# Patient Record
Sex: MALE | Race: WHITE | NOT HISPANIC OR LATINO | Employment: STUDENT | URBAN - METROPOLITAN AREA
[De-identification: names, ages, dates, MRNs, and addresses within clinical notes are randomized per-mention and may not be internally consistent; named-entity substitution may affect disease eponyms.]

---

## 2017-06-05 ENCOUNTER — ALLSCRIPTS OFFICE VISIT (OUTPATIENT)
Dept: OTHER | Facility: OTHER | Age: 27
End: 2017-06-05

## 2017-09-07 ENCOUNTER — ALLSCRIPTS OFFICE VISIT (OUTPATIENT)
Dept: OTHER | Facility: OTHER | Age: 27
End: 2017-09-07

## 2017-12-06 ENCOUNTER — ALLSCRIPTS OFFICE VISIT (OUTPATIENT)
Dept: OTHER | Facility: OTHER | Age: 27
End: 2017-12-06

## 2018-01-10 NOTE — PSYCH
Plan    1  PARoxetine HCl - 40 MG Oral Tablet; TAKE 1 TABLET DAILY    2  Propranolol HCl - 20 MG Oral Tablet; TAKE 1 TABLET Daily for anxiety    Chief Complaint  "I just need refills on my medication "      History of Present Illness  Patient is a 33year old male with a significant psych history of depression and anxiety  He had previously been treated by Scar Oropeza in 2013 and received Klonpin and Paxil  Patient moved to [place] 3 years ago and recently moved back this past November 2016  He started seeing Dr Sharon Pierson, who placed him on Duloxetine for depression and Inderal for anxiety  He was stable on these medications  He is doing well today, but has not had any medication refills in several months  He would like to establish care with a new psychiatrist for continuation of medication management  He currently denies all symptoms of depression and anxiety, and he would like to start back on medication just to make sure that his mood continues to remain consistent  Patient reports variable sleeping patterns, which has always been a problem  Appetite is good  Review of Systems    Constitutional: no fever or chills, feels well, no tiredness, no recent weight loss or weight gain  ENT: no complaints of earache, no loss of hearing, no nosebleeds or nasal discharge, no sore throat or hoarseness  Cardiovascular: no complaints of slow or fast heart rate, no chest pain, no palpitations, no leg claudication or lower extremity edema  Respiratory: no complaints of shortness of breath, no wheezing or cough, no dyspnea on exertion, no orthopnea or PND  Gastrointestinal: no complaints of abdominal pain, no constipation, no nausea or vomiting, no diarrhea or bloody stools  Genitourinary: no complaints of dysuria or incontinence, no hesitancy, no nocturia, no genital lesion, no inadequacy of penile erection     Musculoskeletal: no complaints of arthralgia, no myalgia, no joint swelling or stiffness, no limb pain or swelling  Integumentary: no complaints of skin rash or lesion, no itching or dry skin, no skin wounds  Neurological: no complaints of headache, no confusion, no numbness or tingling, no dizziness or fainting  Past Psychiatric History    Past Psychiatric History:   - outpatient treatment in [place] ([facility] and [facility]) and in [place]   - no inpatient admissions   - no suicide attempts  Substance Abuse Hx    Substance Abuse History:   - occasionally smokes   - denies ETOH and illicit drug use  Allergies    1  No Known Drug Allergies    Family Psych History    - depression and anxiety on maternal and paternal side      Social History    - was previously living with mother, but is now moving into an apartment of his own   - he is going to college in [place] and works at [employer]      History Of Phys/Sex Abuse Or Perpetration    History Of Phys/Sex Abuse or Perpetration:   - none  Physical Exam    Appearance: was calm and cooperative and good eye contact  Observed mood: mood appropriate  Observed mood: affect appropriate  Speech: a normal rate  Thought processes: coherent/organized  Hallucinations: no hallucinations present  Thought Content: no delusions  Abnormal Thoughts: The patient has no suicidal thoughts  Orientation: The patient is oriented to person, place and time  Recent and Remote Memory: short term memory intact and long term memory intact  Attention Span And Concentration: concentration intact  Insight: Insight intact  Judgment: His judgment was intact  Treatment Recommendations: re-start Paxil 40 mg, which patient believes was more effective than Cymbalta and Inderal 20 mg QD for anxiety  follow up in 3 months  End of Encounter Meds    1  PARoxetine HCl - 40 MG Oral Tablet; TAKE 1 TABLET DAILY; Therapy: 40UZY9943 to (Evaluate:20Waa0054); Last Rx:05Jun2017 Ordered    2   Propranolol HCl - 20 MG Oral Tablet; TAKE 1 TABLET Daily for anxiety; Therapy: 70BMB5218 to (Evaluate:69Xsk9459);  Last Rx:05Jun2017 Ordered    Signatures   Electronically signed by : Zeke Lake, Hector Atwood; Jun 5 2017  4:34PM EST                       (Author)    Electronically signed by : Mansoor Montenegro MD; Jun 5 2017  4:58PM EST

## 2018-01-15 NOTE — PSYCH
Psych Med Mgmt    Appearance: was calm and cooperative  Observed mood: mood appropriate  Observed mood: affect appropriate  Speech: a normal rate  Thought processes: coherent/organized  Hallucinations: no hallucinations present  Thought Content: no delusions  Abnormal Thoughts: The patient has no suicidal thoughts  Orientation: The patient is oriented to person, place and time  Recent and Remote Memory: short term memory intact and long term memory intact  Attention Span And Concentration: concentration intact  Insight: Insight intact  Judgment: His judgment was intact  Treatment Recommendations: continue on current medications and add xanax 0 25 mg up to 2 times per day PRN for increased anxiety; follow up in 3 months  He reports normal appetite, normal energy level, no weight change and normal number of sleep hours  Patient reports that he is feeling stable on his current medication regimen  Occasionally, he does experience episodes of anxiety secondary to stresses of school, which just started last week  Overall, Propanolol is helping him to keep his symptoms under control, and his blood pressure is stable  However, he would like an additional medication PRN for days in which his anxiety is increased  He is sleeping well and eating well  No acute distress  Assessment    1  Depression (311) (F32 9)   2  Generalized anxiety disorder (300 02) (F41 1)    Plan    1  PARoxetine HCl - 40 MG Oral Tablet; TAKE 1 TABLET DAILY    2  ALPRAZolam 0 25 MG Oral Tablet; Take up to 2 tablets daily as needed   3  Propranolol HCl - 20 MG Oral Tablet; TAKE 1 TABLET Daily for anxiety    Review of Systems    Constitutional: No fever, no chills, feels well, no tiredness, no recent weight gain or loss  Cardiovascular: no complaints of slow or fast heart rate, no chest pain, no palpitations  Respiratory: no complaints of shortness of breath, no wheezing, no dyspnea on exertion  Gastrointestinal: no complaints of abdominal pain, no constipation, no nausea, no diarrhea, no vomiting  Genitourinary: no complaints of dysuria, no incontinence, no pelvic pain, no urinary frequency  Musculoskeletal: no complaints of arthralgia, no myalgias, no limb pain, no joint stiffness  Integumentary: no complaints of skin rash, no itching, no dry skin  Neurological: no complaints of headache, no confusion, no numbness, no dizziness  Active Problems    1  Depression (311) (F32 9)   2  Generalized anxiety disorder (300 02) (F41 1)    Allergies    1  No Known Drug Allergies    Current Meds   1  PARoxetine HCl - 40 MG Oral Tablet; TAKE 1 TABLET DAILY; Therapy: 12DUB4825 to (Evaluate:05Wpf0854); Last Rx:05Jun2017 Ordered   2  Propranolol HCl - 20 MG Oral Tablet; TAKE 1 TABLET Daily for anxiety; Therapy: 47CBA9067 to (Evaluate:05Mkp4902); Last Rx:05Jun2017 Ordered    End of Encounter Meds    1  PARoxetine HCl - 40 MG Oral Tablet; TAKE 1 TABLET DAILY; Therapy: 95PBM2665 to (Evaluate:87Vtd2753); Last Rx:07Sep2017 Ordered    2  ALPRAZolam 0 25 MG Oral Tablet; Take up to 2 tablets daily as needed; Therapy: 23Vfr4289 to (Evaluate:50Efh5894); Last Rx:07Sep2017 Ordered   3  Propranolol HCl - 20 MG Oral Tablet; TAKE 1 TABLET Daily for anxiety; Therapy: 70FRX9395 to (Evaluate:92Mge0236);  Last BB:11MYT8334 Ordered    Signatures   Electronically signed by : Mirta Mazariegos, [facility]; Sep  7 2017  3:41PM EST                       (Author)    Electronically signed by : Sheri Boss MD; Sep  7 2017  5:21PM EST

## 2018-01-23 VITALS
SYSTOLIC BLOOD PRESSURE: 120 MMHG | BODY MASS INDEX: 20.46 KG/M2 | DIASTOLIC BLOOD PRESSURE: 78 MMHG | WEIGHT: 135 LBS | HEIGHT: 68 IN

## 2018-01-23 NOTE — PSYCH
Psych Med Mgmt    Appearance: was calm and cooperative and good eye contact  Observed mood: mood appropriate  Observed mood: affect appropriate  Speech: a normal rate  Thought processes: coherent/organized  Hallucinations: no hallucinations present  Thought Content: no delusions  Abnormal Thoughts: The patient has no suicidal thoughts and no homicidal thoughts  Orientation: The patient is oriented to person, place and time  Recent and Remote Memory: short term memory intact and long term memory intact  Attention Span And Concentration: concentration intact  Insight: Insight intact  Judgment: His judgment was intact  Treatment Recommendations: Follow up in 3 months  Same meds  The patient has been filling controlled prescriptions on time [facility] [facility]       He reports normal appetite, normal energy level and no weight change  Seen for follow up  In a new same-sex relationship started when he moved to [place]  Mother doing well and is now a realtor  Has been on Paxil and Inderal all working well for patient  Will continue same meds and monitor  No new clinical concerns  Doing well in school  Vitals  Signs   Recorded: 92LDN3422 42:41XG   Systolic: 680  Diastolic: 78  Height: 5 ft 8 in  Weight: 135 lb   BMI Calculated: 20 53  BSA Calculated: 1 73    DSM    Provisional Diagnosis: Major Depression   WHITNEY  GAF--70  Assessment    1  Depression (311) (F32 9)    Plan    1  PARoxetine HCl - 40 MG Oral Tablet; TAKE 1 TABLET DAILY    2  Propranolol HCl - 20 MG Oral Tablet; TAKE 1 TABLET Daily for anxiety   3  ALPRAZolam 0 25 MG Oral Tablet; Take up to 2 tablets daily as needed    Active Problems    1  Depression (311) (F32 9)   2  Generalized anxiety disorder (300 02) (F41 1)    Allergies    1  No Known Drug Allergies    Current Meds   1  ALPRAZolam 0 25 MG Oral Tablet;  Take up to 2 tablets daily as needed; Therapy: 07Ual1506 to (Evaluate:05Vrr6666); Last Rx:13Xul1810 Ordered   2  PARoxetine HCl - 40 MG Oral Tablet; TAKE 1 TABLET DAILY; Therapy: 08SEX3801 to (Evaluate:91Cif5745); Last Rx:01Cag5922 Ordered   3  Propranolol HCl - 20 MG Oral Tablet; TAKE 1 TABLET Daily for anxiety; Therapy: 90NTQ7681 to (Evaluate:49Xro7821); Last Rx:28Lft5335 Ordered    End of Encounter Meds    1  PARoxetine HCl - 40 MG Oral Tablet; TAKE 1 TABLET DAILY; Therapy: 76VHL6553 to ()  Requested for: 37UNK4812; Last   Rx:60Pls9477; Status: ACTIVE - Transmit to Pharmacy - Awaiting Verification Ordered    2  ALPRAZolam 0 25 MG Oral Tablet; Take up to 2 tablets daily as needed; Therapy: 47Zlj6772 to (Evaluate:12Csj0730); Last Rx:17Pmf9186 Ordered   3  Propranolol HCl - 20 MG Oral Tablet; TAKE 1 TABLET Daily for anxiety;    Therapy: 23DAI0834 to ()  Requested for: 02XPK8642; Last   Rx:[id number]; Status: [address] stacie Aquino Verification Ordered    Signatures   Electronically signed by : Darion Horn; Dec  6 2017  4:30PM EST                       (Author)    Electronically signed by : Mikaela Rajan MD; Dec  6 2017  4:55PM EST

## 2018-02-28 ENCOUNTER — OFFICE VISIT (OUTPATIENT)
Dept: PSYCHIATRY | Facility: CLINIC | Age: 28
End: 2018-02-28
Payer: COMMERCIAL

## 2018-02-28 VITALS
WEIGHT: 128 LBS | HEIGHT: 68 IN | BODY MASS INDEX: 19.4 KG/M2 | SYSTOLIC BLOOD PRESSURE: 110 MMHG | DIASTOLIC BLOOD PRESSURE: 80 MMHG

## 2018-02-28 DIAGNOSIS — F33.0 MILD EPISODE OF RECURRENT MAJOR DEPRESSIVE DISORDER (HCC): ICD-10-CM

## 2018-02-28 DIAGNOSIS — F33.1 MAJOR DEPRESSIVE DISORDER, RECURRENT, MODERATE (HCC): Primary | ICD-10-CM

## 2018-02-28 PROCEDURE — 99214 OFFICE O/P EST MOD 30 MIN: CPT | Performed by: NURSE PRACTITIONER

## 2018-02-28 RX ORDER — PROPRANOLOL HYDROCHLORIDE 20 MG/1
20 TABLET ORAL DAILY
COMMUNITY
Start: 2017-06-05 | End: 2018-02-28 | Stop reason: SDUPTHER

## 2018-02-28 RX ORDER — PROPRANOLOL HYDROCHLORIDE 20 MG/1
20 TABLET ORAL DAILY PRN
Qty: 30 TABLET | Refills: 2 | Status: SHIPPED | OUTPATIENT
Start: 2018-02-28 | End: 2018-11-25 | Stop reason: ALTCHOICE

## 2018-02-28 RX ORDER — ALPRAZOLAM 0.25 MG/1
0.25 TABLET ORAL DAILY PRN
COMMUNITY
Start: 2017-09-07

## 2018-02-28 RX ORDER — PAROXETINE 10 MG/1
TABLET, FILM COATED ORAL
Qty: 14 TABLET | Refills: 0 | Status: SHIPPED | OUTPATIENT
Start: 2018-02-28 | End: 2018-11-25 | Stop reason: ALTCHOICE

## 2018-02-28 RX ORDER — PAROXETINE HYDROCHLORIDE 40 MG/1
1 TABLET, FILM COATED ORAL DAILY
COMMUNITY
Start: 2017-06-05 | End: 2018-11-25 | Stop reason: ALTCHOICE

## 2018-02-28 RX ORDER — VENLAFAXINE HYDROCHLORIDE 37.5 MG/1
CAPSULE, EXTENDED RELEASE ORAL
Qty: 60 CAPSULE | Refills: 2 | Status: SHIPPED | OUTPATIENT
Start: 2018-02-28 | End: 2018-11-25 | Stop reason: ALTCHOICE

## 2018-02-28 NOTE — PSYCH
Hank Gottron is a 33 y o  male who presents today for follow up  The the patient has a history of depression, anxiety and mood instability  Has seasonal affective disorder  Feels more depressed during winter season  Feels more sluggish tired and gonzales  No suicidal thoughts  Sleep and appetite are poor  Functions at baseline with schhool and work  Physical Exam   Constitutional: He is oriented to person, place, and time  He appears distressed (weight loss due to depression)  HENT:   Head: Normocephalic  Neurological: He is alert and oriented to person, place, and time  Skin: Skin is warm and dry  Psychiatric: His speech is normal and behavior is normal  Judgment and thought content normal  Cognition and memory are normal  He exhibits a depressed mood  More depressed with SAD  Feels no energy,interest or motivation  Paxil not helping as it once did  MDM/Plan Titrate off Paxil  Over 3 weeks  Continue medications Add Effexor 37 5mg Am for 1 week then increase to 75mg   Follow up in4 weeks

## 2018-11-25 ENCOUNTER — OFFICE VISIT (OUTPATIENT)
Dept: URGENT CARE | Age: 28
End: 2018-11-25
Payer: MEDICARE

## 2018-11-25 VITALS
BODY MASS INDEX: 18.94 KG/M2 | HEART RATE: 112 BPM | RESPIRATION RATE: 20 BRPM | TEMPERATURE: 99.6 F | WEIGHT: 125 LBS | DIASTOLIC BLOOD PRESSURE: 76 MMHG | OXYGEN SATURATION: 97 % | HEIGHT: 68 IN | SYSTOLIC BLOOD PRESSURE: 125 MMHG

## 2018-11-25 DIAGNOSIS — J01.90 ACUTE SINUSITIS, RECURRENCE NOT SPECIFIED, UNSPECIFIED LOCATION: Primary | ICD-10-CM

## 2018-11-25 PROCEDURE — 99213 OFFICE O/P EST LOW 20 MIN: CPT | Performed by: FAMILY MEDICINE

## 2018-11-25 RX ORDER — AMOXICILLIN AND CLAVULANATE POTASSIUM 875; 125 MG/1; MG/1
1 TABLET, FILM COATED ORAL EVERY 12 HOURS SCHEDULED
Qty: 14 TABLET | Refills: 0 | Status: SHIPPED | OUTPATIENT
Start: 2018-11-25 | End: 2018-12-02

## 2018-11-26 NOTE — PATIENT INSTRUCTIONS
Take antibiotics as prescribed  Use Flonase or nasal spray     Stay hydrated with lots of water/fluids  Follow-up with PCP in the next few days for reexamination and to ensure resolution of symptoms  Go to the ED if any intractable fevers, unable to stay hydrated, abdominal pain, chest pain, shortness of breath, new or worsening symptoms or other concerning symptoms

## 2018-11-26 NOTE — PROGRESS NOTES
[facility]        NAME: Erica Mcclain is a 33 y o  male  : 1990    MRN: [id number]  DATE: 2018  TIME: 7:08 PM    Assessment and Plan   Acute sinusitis, recurrence not specified, unspecified location [J01 90]  1  Acute sinusitis, recurrence not specified, unspecified location  amoxicillin-clavulanate (AUGMENTIN) 875-125 mg per tablet     Patient was sore throat, fevers, earache and sinus pressure  Bilateral TMs erythematous but there is a good cone of light and they are not bulging, potential beginnings of an ear infection  Patient does have physical exam findings of a sinus infection so will treat for that    Patient Instructions       Take antibiotics as prescribed  Use Flonase or nasal spray     Stay hydrated with lots of water/fluids  Follow-up with PCP in the next few days for reexamination and to ensure resolution of symptoms  Go to the ED if any intractable fevers, unable to stay hydrated, abdominal pain, chest pain, shortness of breath, new or worsening symptoms or other concerning symptoms  Chief Complaint     Chief Complaint   Patient presents with    Cold Like Symptoms     Nasal and chest congestion  Fever  Sore throat  History of Present Illness       Sinusitis   This is a new problem  The current episode started yesterday  The problem is unchanged  The maximum temperature recorded prior to his arrival was 102 - 102 9 F  The fever has been present for less than 1 day  His pain is at a severity of 5/10  The pain is mild  Associated symptoms include congestion, coughing, sinus pressure and a sore throat  Pertinent negatives include no chills, ear pain, headaches, neck pain or shortness of breath  Past treatments include acetaminophen (TheraFlu)  The treatment provided mild relief  Patient states it feels like last time he had strep and a sinus infection  Denies any medical problems  Eating and drinking    Staying hydrated    Review of Systems   Review of Systems   Constitutional: Positive for fever  Negative for chills and fatigue  HENT: Positive for congestion, rhinorrhea, sinus pressure and sore throat  Negative for ear pain and trouble swallowing  Eyes: Negative for itching and visual disturbance  Respiratory: Positive for cough  Negative for shortness of breath and wheezing  Cardiovascular: Negative for chest pain and leg swelling  Gastrointestinal: Negative for abdominal pain, diarrhea, nausea and vomiting  Musculoskeletal: Negative for back pain, joint swelling, neck pain and neck stiffness  Skin: Negative for rash  Neurological: Negative for dizziness, seizures, weakness, numbness and headaches  All other systems reviewed and are negative  Current Medications       Current Outpatient Prescriptions:     Escitalopram Oxalate (LEXAPRO PO), Take by mouth, Disp: , Rfl:     ALPRAZolam (XANAX) 0 25 mg tablet, Take 0 25 mg by mouth daily as needed, Disp: , Rfl:     amoxicillin-clavulanate (AUGMENTIN) 875-125 mg per tablet, Take 1 tablet by mouth every 12 (twelve) hours for 7 days, Disp: 14 tablet, Rfl: 0    Current Allergies     Allergies as of 11/25/2018    (No Known Allergies)            The following portions of the patient's history were reviewed and updated as appropriate: allergies, current medications, past family history, past medical history, past social history, past surgical history and problem list      Past Medical History:   Diagnosis Date    Depression        No past surgical history on file  No family history on file  Medications have been verified  Objective   /76   Pulse (!) 112   Temp 99 6 °F (37 6 °C) (Oral)   Resp 20   Ht 5' 8" (1 727 m)   Wt 56 7 kg (125 lb)   SpO2 97%   BMI 19 01 kg/m²        Physical Exam     Physical Exam   Constitutional: He is oriented to person, place, and time  He appears well-developed and well-nourished  No distress     HENT:   Head: Normocephalic and atraumatic  Right Ear: Hearing, external ear and ear canal normal  Tympanic membrane is erythematous  Tympanic membrane is not bulging  Left Ear: Hearing, external ear and ear canal normal  Tympanic membrane is erythematous  Tympanic membrane is not bulging  Nose: Rhinorrhea present  Right sinus exhibits maxillary sinus tenderness  Right sinus exhibits no frontal sinus tenderness  Left sinus exhibits maxillary sinus tenderness  Left sinus exhibits no frontal sinus tenderness  Mouth/Throat: Uvula is midline and mucous membranes are normal  Posterior oropharyngeal erythema present  No oropharyngeal exudate, posterior oropharyngeal edema or tonsillar abscesses  Eyes: Pupils are equal, round, and reactive to light  Conjunctivae and EOM are normal    Neck: Normal range of motion  Neck supple  Cardiovascular: Normal rate, regular rhythm and normal heart sounds  Pulmonary/Chest: Effort normal and breath sounds normal  No respiratory distress  He has no wheezes  He exhibits no tenderness  Musculoskeletal: Normal range of motion  He exhibits no tenderness  Neurological: He is alert and oriented to person, place, and time  Skin: Skin is warm and dry  No rash noted  Psychiatric: He has a normal mood and affect  His behavior is normal    Nursing note and vitals reviewed

## 2019-02-17 RX ORDER — ESCITALOPRAM OXALATE 10 MG/1
TABLET ORAL
Qty: 30 TABLET | Refills: 2 | OUTPATIENT
Start: 2019-02-17

## 2019-02-21 RX ORDER — ESCITALOPRAM OXALATE 10 MG/1
TABLET ORAL
Qty: 30 TABLET | Refills: 2 | OUTPATIENT
Start: 2019-02-21

## 2023-06-08 ENCOUNTER — OFFICE VISIT (OUTPATIENT)
Dept: INTERNAL MEDICINE CLINIC | Facility: CLINIC | Age: 33
End: 2023-06-08
Payer: COMMERCIAL

## 2023-06-08 VITALS
DIASTOLIC BLOOD PRESSURE: 70 MMHG | OXYGEN SATURATION: 98 % | BODY MASS INDEX: 20.25 KG/M2 | WEIGHT: 126 LBS | HEART RATE: 72 BPM | RESPIRATION RATE: 16 BRPM | TEMPERATURE: 99.6 F | SYSTOLIC BLOOD PRESSURE: 100 MMHG | HEIGHT: 66 IN

## 2023-06-08 DIAGNOSIS — Z13.220 ENCOUNTER FOR LIPID SCREENING FOR CARDIOVASCULAR DISEASE: ICD-10-CM

## 2023-06-08 DIAGNOSIS — Z11.59 NEED FOR HEPATITIS C SCREENING TEST: ICD-10-CM

## 2023-06-08 DIAGNOSIS — R94.6 ABNORMAL THYROID FUNCTION TEST: ICD-10-CM

## 2023-06-08 DIAGNOSIS — H93.12 LEFT-SIDED TINNITUS: ICD-10-CM

## 2023-06-08 DIAGNOSIS — Z13.6 ENCOUNTER FOR LIPID SCREENING FOR CARDIOVASCULAR DISEASE: ICD-10-CM

## 2023-06-08 DIAGNOSIS — F33.41 RECURRENT MAJOR DEPRESSIVE DISORDER, IN PARTIAL REMISSION (HCC): Primary | ICD-10-CM

## 2023-06-08 PROCEDURE — 99204 OFFICE O/P NEW MOD 45 MIN: CPT | Performed by: INTERNAL MEDICINE

## 2023-06-08 RX ORDER — FLUTICASONE PROPIONATE 50 MCG
SPRAY, SUSPENSION (ML) NASAL
COMMUNITY
Start: 2023-03-30 | End: 2023-06-08

## 2023-06-08 RX ORDER — LORATADINE 10 MG/1
10 TABLET ORAL DAILY
COMMUNITY

## 2023-06-08 RX ORDER — ESCITALOPRAM OXALATE 10 MG/1
10 TABLET ORAL DAILY
COMMUNITY

## 2023-06-08 RX ORDER — FOLIC ACID/MULTIVIT,IRON,MINER .4-18-35
1 TABLET,CHEWABLE ORAL DAILY
COMMUNITY

## 2023-06-08 RX ORDER — TRIAMCINOLONE ACETONIDE 55 UG/1
SPRAY, METERED NASAL DAILY
COMMUNITY

## 2023-06-08 NOTE — PROGRESS NOTES
Name: Shannon Louise      : 1990      MRN: [id number]  Encounter Provider: Rinku Vogel DO  Encounter Date: 2023   Encounter department: [place] [address]     1  Recurrent major depressive disorder, in partial remission ([facility] [address] )  Comments:  taking SSRI and symptoms stable  refer to therapy and for psychiatry  Orders:  -     Ambulatory Referral to Psychiatry; Future  -     Comprehensive metabolic panel; Future  -     CBC and differential  -     Ambulatory Referral to [facility]; Future    2  Left-sided tinnitus  Comments:  ongoing for months and not improved  no antecedent cause  he is s/p audiology eval and would like to see ENT which is reasonable  Orders:  -     Comprehensive metabolic panel; Future  -     CBC and differential  -     Ambulatory Referral to Otolaryngology; Future    3  Abnormal thyroid function test  Comments:  noted on BW from 2019 in Epic, check again now  Orders:  -     TSH, 3rd generation with Free T4 reflex    4  Encounter for lipid screening for cardiovascular disease  -     Lipid Panel with Direct LDL reflex; Future    5  Need for hepatitis C screening test  -     Hepatitis C antibody; Future           Subjective      HPI     New to practice, here to establish care  Takes only lexapro on a regular basis and some OTC meds  He moved from [place] back to [place] recently  He has been dealing with left sided tinnitus for months s/p audiology eval   It did not start after a sinus infection or URI  His hearing test showed some mild hearing loss on left side  He has no other associated symptoms  He would like to see ENT for eval   He also has a hx of depression and takes lexapro for this with adequate relief  He was seeing a therapist and psychiatrist in [place], prior to moving  He saw Dr Elsie Mora in [place] in past   He has a strong fhx of depression  He has no SI  He has not had BW in sometime    He is UTD On Tdap(couple of yrs ago) "and COVID Primary series  ROS otherwise negative, no other complaints  Review of Systems   Constitutional: Negative for fever  HENT: Positive for tinnitus  Negative for congestion and ear pain  Eyes: Negative for visual disturbance  Respiratory: Negative for shortness of breath  Cardiovascular: Negative for chest pain  Gastrointestinal: Negative for abdominal pain  Endocrine: Negative for polyuria  Genitourinary: Negative for difficulty urinating  Musculoskeletal: Negative for gait problem  Skin: Negative for rash  Allergic/Immunologic: Negative for immunocompromised state  Neurological: Negative for weakness  Psychiatric/Behavioral: Positive for dysphoric mood  Negative for suicidal ideas  Current Outpatient Medications on File Prior to Visit   Medication Sig   • cyanocobalamin (VITAMIN B-12) 1000 MCG tablet Take 1,000 mcg by mouth daily   • escitalopram (LEXAPRO) 10 mg tablet Take 10 mg by mouth daily   • loratadine (CLARITIN) 10 mg tablet Take 10 mg by mouth daily   • multivitamin-iron-minerals-folic acid (CENTRUM) chewable tablet Chew 1 tablet daily   • Triamcinolone Acetonide (Nasacort Allergy) 55 MCG/ACT nasal spray by Each Nare route daily   • [DISCONTINUED] ALPRAZolam (XANAX) 0 25 mg tablet Take 0 25 mg by mouth daily as needed   • [DISCONTINUED] Escitalopram Oxalate (LEXAPRO PO) Take by mouth   • [DISCONTINUED] fluticasone (FLONASE) 50 mcg/act nasal spray SPRAY 2 SPRAYS INTO EACH NOSTRIL TWICE A DAY       Objective     /70   Pulse 72   Temp 99 6 °F (37 6 °C) (Tympanic)   Resp 16   Ht 5' 6 34\" (1 685 m)   Wt 57 2 kg (126 lb)   SpO2 98%   BMI 20 13 kg/m²     Physical Exam  Vitals reviewed  Constitutional:       General: He is not in acute distress  HENT:      Head: Normocephalic and atraumatic        Right Ear: Tympanic membrane and ear canal normal       Left Ear: Tympanic membrane and ear canal normal    Eyes:      Conjunctiva/sclera: Conjunctivae normal  " Cardiovascular:      Rate and Rhythm: Normal rate and regular rhythm  Heart sounds:      No gallop  Pulmonary:      Effort: Pulmonary effort is normal       Breath sounds: No wheezing or rales  Abdominal:      General: Bowel sounds are normal       Palpations: Abdomen is soft  Tenderness: There is no abdominal tenderness  Musculoskeletal:      Cervical back: Neck supple  Right lower leg: No edema  Left lower leg: No edema  Neurological:      Mental Status: He is alert  Mental status is at baseline  Psychiatric:         Mood and Affect: Mood normal          Behavior: Behavior normal          Thought Content: Thought content does not include suicidal ideation         Luigi Vargas, DO

## 2023-06-08 NOTE — PATIENT INSTRUCTIONS
Fasting blood work  Appointment with therapist  ENT specialist appointment  Appointment with [facility] psychiatry

## 2023-06-09 LAB
ALBUMIN SERPL-MCNC: 4.7 G/DL (ref 3.6–5.1)
ALBUMIN/GLOB SERPL: 2 (CALC) (ref 1–2.5)
ALP SERPL-CCNC: 55 U/L (ref 36–130)
ALT SERPL-CCNC: 24 U/L (ref 9–46)
AST SERPL-CCNC: 18 U/L (ref 10–40)
BASOPHILS # BLD AUTO: 51 CELLS/UL (ref 0–200)
BASOPHILS NFR BLD AUTO: 1 %
BILIRUB SERPL-MCNC: 0.4 MG/DL (ref 0.2–1.2)
BUN SERPL-MCNC: 16 MG/DL (ref 7–25)
BUN/CREAT SERPL: NORMAL (CALC) (ref 6–22)
CALCIUM SERPL-MCNC: 9.8 MG/DL (ref 8.6–10.3)
CHLORIDE SERPL-SCNC: 103 MMOL/L (ref 98–110)
CHOLEST SERPL-MCNC: 172 MG/DL
CHOLEST/HDLC SERPL: 2.9 (CALC)
CO2 SERPL-SCNC: 32 MMOL/L (ref 20–32)
CREAT SERPL-MCNC: 0.78 MG/DL (ref 0.6–1.26)
EOSINOPHIL # BLD AUTO: 143 CELLS/UL (ref 15–500)
EOSINOPHIL NFR BLD AUTO: 2.8 %
ERYTHROCYTE [DISTWIDTH] IN BLOOD BY AUTOMATED COUNT: 13.1 % (ref 11–15)
GFR/BSA.PRED SERPLBLD CYS-BASED-ARV: 122 ML/MIN/1.73M2
GLOBULIN SER CALC-MCNC: 2.4 G/DL (CALC) (ref 1.9–3.7)
GLUCOSE SERPL-MCNC: 84 MG/DL (ref 65–99)
HCT VFR BLD AUTO: 42 % (ref 38.5–50)
HCV AB S/CO SERPL IA: 0.09
HCV AB SERPL QL IA: NORMAL
HDLC SERPL-MCNC: 59 MG/DL
HGB BLD-MCNC: 13.8 G/DL (ref 13.2–17.1)
LDLC SERPL CALC-MCNC: 96 MG/DL (CALC)
LYMPHOCYTES # BLD AUTO: 1607 CELLS/UL (ref 850–3900)
LYMPHOCYTES NFR BLD AUTO: 31.5 %
MCH RBC QN AUTO: 28.3 PG (ref 27–33)
MCHC RBC AUTO-ENTMCNC: 32.9 G/DL (ref 32–36)
MCV RBC AUTO: 86.1 FL (ref 80–100)
MONOCYTES # BLD AUTO: 541 CELLS/UL (ref 200–950)
MONOCYTES NFR BLD AUTO: 10.6 %
NEUTROPHILS # BLD AUTO: 2759 CELLS/UL (ref 1500–7800)
NEUTROPHILS NFR BLD AUTO: 54.1 %
NONHDLC SERPL-MCNC: 113 MG/DL (CALC)
PLATELET # BLD AUTO: 229 THOUSAND/UL (ref 140–400)
PMV BLD REES-ECKER: 10.9 FL (ref 7.5–12.5)
POTASSIUM SERPL-SCNC: 4.4 MMOL/L (ref 3.5–5.3)
PROT SERPL-MCNC: 7.1 G/DL (ref 6.1–8.1)
RBC # BLD AUTO: 4.88 MILLION/UL (ref 4.2–5.8)
SODIUM SERPL-SCNC: 138 MMOL/L (ref 135–146)
TRIGL SERPL-MCNC: 76 MG/DL
TSH SERPL-ACNC: 2.5 MIU/L (ref 0.4–4.5)
WBC # BLD AUTO: 5.1 THOUSAND/UL (ref 3.8–10.8)

## 2023-06-12 ENCOUNTER — TELEPHONE (OUTPATIENT)
Dept: INTERNAL MEDICINE CLINIC | Facility: CLINIC | Age: 33
End: 2023-06-12

## 2023-06-12 NOTE — TELEPHONE ENCOUNTER
----- Message from Paul Corbett DO sent at 6/11/2023  9:26 PM EDT -----  BW is back and is all normal including cholesterol and hep C screening test   Please mail a copy of BW results to [facility] if he would like to have

## 2023-06-13 ENCOUNTER — TELEPHONE (OUTPATIENT)
Dept: PSYCHIATRY | Facility: CLINIC | Age: 33
End: 2023-06-13

## 2023-07-14 ENCOUNTER — SOCIAL WORK (OUTPATIENT)
Dept: BEHAVIORAL/MENTAL HEALTH CLINIC | Facility: CLINIC | Age: 33
End: 2023-07-14
Payer: COMMERCIAL

## 2023-07-14 DIAGNOSIS — F33.0 MILD EPISODE OF RECURRENT MAJOR DEPRESSIVE DISORDER (HCC): Primary | ICD-10-CM

## 2023-07-14 DIAGNOSIS — F41.1 GAD (GENERALIZED ANXIETY DISORDER): ICD-10-CM

## 2023-07-14 PROCEDURE — 90791 PSYCH DIAGNOSTIC EVALUATION: CPT | Performed by: SOCIAL WORKER

## 2023-07-14 NOTE — PSYCH
Behavioral Health Psychotherapy Assessment    Date of Initial Psychotherapy Assessment: 07/14/23  Referral Source: Dr. Juancho Moraes. Has a release of information been signed for the referral source? Yes    Preferred Name: Cecelia Alaniz  Preferred Pronouns: He/him  YOB: 1990 Age: 29 y.o. Sex assigned at birth: male   Gender Identity: male  Race:   Preferred Language: English    Emergency Contact:  Full Name: Ayan Yañez  Relationship to Client: Mother   Contact information: Demographics. Primary Care Physician:  Lorenza Davis DO  [address]  [phone]  Has a release of information been signed? Yes    Physical Health History:  Past surgical procedures: only major surgery is wisdom teeth removed. Do you have a history of any of the following: other dizziness. Do you have any mobility issues? No    Relevant Family History:  Hx of depression in both parents families. Presenting Problem (What brings you in?)  Depressed, struggling with anxiety. Pt has had several recent stressors which has kicked the depression up significantly. Hearing loss, loss of job in sales, feeling stuck. End of relationship in [place] and relocation back to this area. Pt is living by himself. Pt has perfectionist tendencies, and equates his value with achievement. Pt does not have a job, does not like where he lives and feels empty. Pt reports recent issue with Tinnitus which is causing him distress, hearing loss and feeling like he is broken. Pt and writer discussed immediate goals which include referral back to PCP for medicine. Pt is also encouraged to initiate process of getting hearing tested and hearing aid. Mental Health Advance Directive:  Do you currently have a Mental Health Advance Directive? no    Diagnosis:   Diagnosis ICD-10-CM Associated Orders   1. Mild episode of recurrent major depressive disorder (HCC)  F33.0       2.  WHITNEY (generalized anxiety disorder)  F41.1           Initial Assessment:     Current Mental Status:    Appearance: appropriate and casual      Behavior/Manner: cooperative and restless      Affect/Mood:  Depressed    Speech:  Normal and articulate   Clinical Symptoms    Depression: yes      Anxiety: yes      Depression Symptoms: depressed mood, restlessness, serious loss of interest in things, social isolation, fatigue, indecision, poor concentration, recent weight loss, sleep disturbance and irritable      Anxiety Symptoms: excessive worry, fatigues easily, irritable, fear of losing control, nervous/anxious, difficulty controlling worry, restlessness, chest tightness and dizziness      Have you ever been assaultive to others or the environment: No      Have you ever been self-injurious: No      Counseling History:  Previous Counseling or Treatment  (Mental Health or Drug & Alcohol): Yes    Previous Counseling Details:  Talked to a cosem called Duong Moralez in [place]. Have you previously taken psychiatric medications: Yes    Previous Medications Attempted:  Lexapro, Paxil. - nothing currently. Suicide Risk Assessment  Have you ever had a suicide attempt: No    Have you had incidents of suicidal ideation: Yes    Are you currently experiencing suicidal thoughts: No    Additional Suicide Risk Information:  Past thoughts of SI but no plan. Mostly in high school. Substance Abuse/Addiction Assessment:  Alcohol: Yes    Age of First Use:  24  Age of regular use:  22  Frequency:  Monthly  Amount:  Occasioal beer. Last use:  Last month.    Heroin: No    Fentanyl: No    Opiates: No    Cocaine: No    Amphetamines: No    Hallucinogens: No    Club Drugs: No    Benzodiazepines: No    Other Rx Meds: No    Marijuana: No    Tobacco/Nicotine: No    Have you experienced blackouts as a result of substance use: No    Are you currently using any Medication Assisted Treatment for Substance Use: No      Compulsive Behaviors:  Compulsive Behavior Information: Excessive shopping. Disordered Eating History:  Do you have a history of disordered eating: No      Social Determinants of Health:    SDOH:  Stress and financial instability    Trauma and Abuse History:    Have you ever been abused: No      Legal History:    Have you ever been arrested  or had a DUI: No      Have you been incarcerated: No      Are you currently on parole/probation: No      Any current Children and Youth involvement: No      Any pending legal charges: No      Relationship History:    Current marital status: single      Natural Supports: Mother, father, siblings and friends    Relationship History:  Recent break up with boyfriend which precipitated current mental health crisis, and relocation from [place] back to [facility]. Parents are supportive and pt has friends in the area. Employment History    Are you currently employed: No      Currently seeking employment: Yes      Longest period of employment:  . 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 Sources of income/financial support:  Family members     History:      Status: no history of [address] duty  Educational History:     Have you ever been diagnosed with a learning disability: No      Highest level of education:  [address] attended/attending:  Maximino Go.      Have you ever had an IEP or 504-plan: No      Do you need assistance with reading or writing: No      Recommended Treatment:     Psychotherapy:  Individual sessions    Frequency:  1 time    Session frequency:  Monthly      Visit start and stop times:    07/14/23  Start Time: 1004  Stop Time: 1055  Total Visit Time: 51 minutes

## 2023-07-20 ENCOUNTER — OFFICE VISIT (OUTPATIENT)
Dept: INTERNAL MEDICINE CLINIC | Facility: CLINIC | Age: 33
End: 2023-07-20
Payer: COMMERCIAL

## 2023-07-20 VITALS
HEART RATE: 77 BPM | WEIGHT: 130 LBS | TEMPERATURE: 99.1 F | BODY MASS INDEX: 20.77 KG/M2 | OXYGEN SATURATION: 98 % | DIASTOLIC BLOOD PRESSURE: 78 MMHG | SYSTOLIC BLOOD PRESSURE: 118 MMHG

## 2023-07-20 DIAGNOSIS — F41.1 GAD (GENERALIZED ANXIETY DISORDER): ICD-10-CM

## 2023-07-20 DIAGNOSIS — F33.1 MODERATE EPISODE OF RECURRENT MAJOR DEPRESSIVE DISORDER (HCC): Primary | ICD-10-CM

## 2023-07-20 PROBLEM — H93.13 TINNITUS OF BOTH EARS: Status: ACTIVE | Noted: 2023-07-10

## 2023-07-20 PROBLEM — H90.3 BILATERAL SENSORINEURAL HEARING LOSS: Status: ACTIVE | Noted: 2023-07-10

## 2023-07-20 PROCEDURE — 99213 OFFICE O/P EST LOW 20 MIN: CPT | Performed by: INTERNAL MEDICINE

## 2023-07-20 RX ORDER — ESCITALOPRAM OXALATE 10 MG/1
10 TABLET ORAL DAILY
Qty: 30 TABLET | Refills: 0 | Status: SHIPPED | OUTPATIENT
Start: 2023-07-20 | End: 2023-07-28 | Stop reason: SDUPTHER

## 2023-07-20 NOTE — PROGRESS NOTES
Name: Donny Halsted      : 1990      MRN: [id number]  Encounter Provider: Brandy Bowles DO  Encounter Date: 2023   Encounter department: [facility] INTERNAL MEDICINE    Assessment & Plan     1. Moderate episode of recurrent major depressive disorder ([address])  Comments:  restart lexapro. f/u in 8 days, he contracted to safety and to follow up visit next week. precautions advised should he be in crisis -> go to ER right away    2. WHITNEY (generalized anxiety disorder)  -     escitalopram (LEXAPRO) 10 mg tablet; Take 1 tablet (10 mg total) by mouth daily         Counseling also provided today. Subjective      HPI     Here for follow up, Denise Light had an ENT eval and they determined he has hearing loss with tinnitus. He is a candidate for hearing aids. This was a difficult thing for him to hear and he still has not digested this information. He does not want to wear a hearing aid and has no fhx hearing loss. He has a strong fhx mental illness. He stopped taking his lexapro for unclear reasons. PHQ-9 score of 21. He has had thoughts of self-harm but no plan or intent and would never do so because he does not have the guts to do it. He verbally confirmed with me that he is safe and has no plans or idea to harm himself twice during the visit. He lives near his parents and he speaks with them about how he is feeling. He met with therapist and found that helpful. ROS otherwise negative, no other complaints. Review of Systems   Constitutional: Negative for fever. HENT: Positive for hearing loss and tinnitus. Eyes: Negative for visual disturbance. Respiratory: Negative for shortness of breath. Cardiovascular: Negative for chest pain. Gastrointestinal: Negative for abdominal pain. Endocrine: Negative for polyuria. Genitourinary: Negative for difficulty urinating. Musculoskeletal: Negative for arthralgias. Skin: Negative for rash.    Allergic/Immunologic: Negative for immunocompromised state. Neurological: Negative for dizziness. Psychiatric/Behavioral: Positive for dysphoric mood. Negative for self-injury. The patient is nervous/anxious. Current Outpatient Medications on File Prior to Visit   Medication Sig   • cyanocobalamin (VITAMIN B-12) 1000 MCG tablet Take 1,000 mcg by mouth daily   • loratadine (CLARITIN) 10 mg tablet Take 10 mg by mouth daily   • multivitamin-iron-minerals-folic acid (CENTRUM) chewable tablet Chew 1 tablet daily   • Triamcinolone Acetonide (Nasacort Allergy) 55 MCG/ACT nasal spray by Each Nare route daily   • [DISCONTINUED] escitalopram (LEXAPRO) 10 mg tablet Take 10 mg by mouth daily       Objective     /78 (BP Location: Left arm, Patient Position: Sitting, Cuff Size: Standard)   Pulse 77   Temp 99.1 °F (37.3 °C)   Wt 59 kg (130 lb)   SpO2 98%   BMI 20.77 kg/m²     Physical Exam  Vitals reviewed. Constitutional:       General: He is not in acute distress. HENT:      Head: Normocephalic and atraumatic. Right Ear: Tympanic membrane normal.      Left Ear: Tympanic membrane normal.   Eyes:      Conjunctiva/sclera: Conjunctivae normal.   Cardiovascular:      Rate and Rhythm: Normal rate and regular rhythm. Heart sounds: No murmur heard. Pulmonary:      Effort: Pulmonary effort is normal.      Breath sounds: No wheezing or rales. Abdominal:      General: Bowel sounds are normal.      Palpations: Abdomen is soft. Tenderness: There is no abdominal tenderness. Musculoskeletal:      Cervical back: Normal range of motion. Right lower leg: No edema. Left lower leg: No edema. Neurological:      Mental Status: He is alert. Mental status is at baseline. Psychiatric:         Mood and Affect: Mood is anxious and depressed. Behavior: Behavior normal.         Thought Content: Thought content does not include suicidal ideation. Thought content does not include suicidal plan.        Leonel Foreman DO

## 2023-07-28 ENCOUNTER — OFFICE VISIT (OUTPATIENT)
Dept: INTERNAL MEDICINE CLINIC | Facility: CLINIC | Age: 33
End: 2023-07-28

## 2023-07-28 VITALS
BODY MASS INDEX: 20.77 KG/M2 | DIASTOLIC BLOOD PRESSURE: 78 MMHG | OXYGEN SATURATION: 98 % | SYSTOLIC BLOOD PRESSURE: 112 MMHG | TEMPERATURE: 99 F | HEART RATE: 80 BPM | WEIGHT: 130 LBS

## 2023-07-28 DIAGNOSIS — F41.1 GAD (GENERALIZED ANXIETY DISORDER): ICD-10-CM

## 2023-07-28 DIAGNOSIS — F33.1 MODERATE EPISODE OF RECURRENT MAJOR DEPRESSIVE DISORDER (HCC): Primary | ICD-10-CM

## 2023-07-28 RX ORDER — ESCITALOPRAM OXALATE 10 MG/1
10 TABLET ORAL DAILY
Qty: 30 TABLET | Refills: 1 | Status: SHIPPED | OUTPATIENT
Start: 2023-07-28

## 2023-07-28 NOTE — PROGRESS NOTES
Name: Juana Maravilla      : 1990      MRN: [id number]  Encounter Provider: Missy Abdi DO  Encounter Date: 2023   Encounter department: [address] [place] [address]     1. Moderate episode of recurrent major depressive disorder ([address])  Comments:  taking lexapro and doing better, c/w rx and f/u in 6 weeks    2. WHITNEY (generalized anxiety disorder)  Comments:  taking lexapro and doing better, c/w rx and f/u in 6 weeks  Orders:  -     escitalopram (LEXAPRO) 10 mg tablet; Take 1 tablet (10 mg total) by mouth daily           Subjective      HPI     Here for follow up, Artie Mortimer is taking lexapro and is doing a bit better. He saw audiology at [facility] and is going to try out a hearing aid next week. He is still coming to terms with his tinnitus and hearing loss diagnosis. PHQ-9 score of 8, denies SI. He has no AE from lexapro. ROS otherwise negative, no other complaints. Review of Systems   Constitutional: Negative for fever. HENT: Positive for hearing loss and tinnitus. Allergic/Immunologic: Negative for immunocompromised state. Psychiatric/Behavioral: Positive for dysphoric mood (but doing better). Negative for suicidal ideas. Current Outpatient Medications on File Prior to Visit   Medication Sig   • cyanocobalamin (VITAMIN B-12) 1000 MCG tablet Take 1,000 mcg by mouth daily   • loratadine (CLARITIN) 10 mg tablet Take 10 mg by mouth daily   • multivitamin-iron-minerals-folic acid (CENTRUM) chewable tablet Chew 1 tablet daily   • Triamcinolone Acetonide (Nasacort Allergy) 55 MCG/ACT nasal spray by Each Nare route daily   • [DISCONTINUED] escitalopram (LEXAPRO) 10 mg tablet Take 1 tablet (10 mg total) by mouth daily       Objective     /78 (BP Location: Left arm, Patient Position: Sitting, Cuff Size: Standard)   Pulse 80   Temp 99 °F (37.2 °C)   Wt 59 kg (130 lb)   SpO2 98%   BMI 20.77 kg/m²     Physical Exam  Vitals reviewed.    Constitutional: General: He is not in acute distress. HENT:      Head: Normocephalic and atraumatic. Eyes:      Conjunctiva/sclera: Conjunctivae normal.   Cardiovascular:      Rate and Rhythm: Normal rate and regular rhythm. Heart sounds: No murmur heard. Pulmonary:      Effort: Pulmonary effort is normal.      Breath sounds: No wheezing or rales. Neurological:      Mental Status: He is alert. Psychiatric:         Mood and Affect: Mood normal. Affect is flat. Behavior: Behavior normal.         Thought Content: Thought content does not include suicidal ideation.        Vane Charles, DO

## 2023-09-08 ENCOUNTER — OFFICE VISIT (OUTPATIENT)
Dept: INTERNAL MEDICINE CLINIC | Facility: CLINIC | Age: 33
End: 2023-09-08
Payer: COMMERCIAL

## 2023-09-08 VITALS
BODY MASS INDEX: 20.13 KG/M2 | WEIGHT: 126 LBS | OXYGEN SATURATION: 97 % | SYSTOLIC BLOOD PRESSURE: 120 MMHG | DIASTOLIC BLOOD PRESSURE: 78 MMHG | TEMPERATURE: 98.5 F | HEART RATE: 80 BPM

## 2023-09-08 DIAGNOSIS — F33.41 RECURRENT MAJOR DEPRESSIVE DISORDER, IN PARTIAL REMISSION (HCC): Primary | ICD-10-CM

## 2023-09-08 DIAGNOSIS — F41.1 GAD (GENERALIZED ANXIETY DISORDER): ICD-10-CM

## 2023-09-08 PROCEDURE — 99213 OFFICE O/P EST LOW 20 MIN: CPT | Performed by: INTERNAL MEDICINE

## 2023-09-08 RX ORDER — ESCITALOPRAM OXALATE 10 MG/1
10 TABLET ORAL DAILY
Qty: 90 TABLET | Refills: 0 | Status: SHIPPED | OUTPATIENT
Start: 2023-09-08

## 2023-09-08 NOTE — PROGRESS NOTES
Name: Annabelle Zhang      : 1990      MRN: [id number]  Encounter Provider: Skyler Calle DO  Encounter Date: 2023   Encounter department: [facility] INTERNAL MEDICINE    Assessment & Plan     1. Recurrent major depressive disorder, in partial remission ([address])  Comments:  overall stable. discussed benefits of medication adherence. he will try and f/u in 3 mos  Orders:  -     escitalopram (LEXAPRO) 10 mg tablet; Take 1 tablet (10 mg total) by mouth daily    2. WHITNEY (generalized anxiety disorder)  Comments:  overall stable. discussed benefits of medication adherence. he will try and f/u in 3 mos  Orders:  -     escitalopram (LEXAPRO) 10 mg tablet; Take 1 tablet (10 mg total) by mouth daily           Subjective      HPI     Here for follow up, Lorena Ellison is doing a bit better. He is using hearing aids but this has not improved his tinnitus. He is struggling with this and has tried multiple medical and complementary modalities for this without benefit. He is missing doses of lexapro often but states he knows it helps him. He denies SI and is considering getting a 2nd opinion for his tinnitus at a academic [facility] in [facility] or elsewhere. He is starting a job soon. ROS otherwise negative, no other complaints. Review of Systems   Constitutional: Negative for fever. HENT: Positive for tinnitus. Respiratory: Negative for shortness of breath. Cardiovascular: Negative for chest pain. Gastrointestinal: Negative for abdominal pain. Musculoskeletal: Negative for gait problem. Allergic/Immunologic: Negative for immunocompromised state. Psychiatric/Behavioral: Positive for dysphoric mood. Negative for suicidal ideas. The patient is nervous/anxious.         Current Outpatient Medications on File Prior to Visit   Medication Sig   • cyanocobalamin (VITAMIN B-12) 1000 MCG tablet Take 1,000 mcg by mouth daily   • loratadine (CLARITIN) 10 mg tablet Take 10 mg by mouth daily   • multivitamin-iron-minerals-folic acid (CENTRUM) chewable tablet Chew 1 tablet daily   • Triamcinolone Acetonide (Nasacort Allergy) 55 MCG/ACT nasal spray by Each Nare route daily   • [DISCONTINUED] escitalopram (LEXAPRO) 10 mg tablet Take 1 tablet (10 mg total) by mouth daily       Objective     /78 (BP Location: Left arm, Patient Position: Sitting, Cuff Size: Standard)   Pulse 80   Temp 98.5 °F (36.9 °C)   Wt 57.2 kg (126 lb)   SpO2 97%   BMI 20.13 kg/m²     Physical Exam  Vitals reviewed. Constitutional:       General: He is not in acute distress. HENT:      Head: Normocephalic and atraumatic. Right Ear: Tympanic membrane normal.      Left Ear: Tympanic membrane normal.      Ears:      Comments: Wearing hearing aids b/l  Cardiovascular:      Rate and Rhythm: Normal rate and regular rhythm. Heart sounds: No murmur heard. Pulmonary:      Effort: Pulmonary effort is normal.      Breath sounds: No wheezing or rales. Abdominal:      General: Bowel sounds are normal.      Palpations: Abdomen is soft. Tenderness: There is no abdominal tenderness. Neurological:      Mental Status: He is alert.    Psychiatric:         Mood and Affect: Mood normal.         Behavior: Behavior normal.       Ubaldo Cummings DO

## 2023-10-12 ENCOUNTER — TELEPHONE (OUTPATIENT)
Dept: INTERNAL MEDICINE CLINIC | Facility: CLINIC | Age: 33
End: 2023-10-12
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
## 2023-10-12 NOTE — TELEPHONE ENCOUNTER
Patient called requesting to be tested for lyme.   I asked him the reason behind it , was he bite by a tick etc.  He stated he just wants it done , to rule out other possible issues   Did not give any other explanation       He is aware you are out the office until next week  Please advise

## 2023-10-13 NOTE — TELEPHONE ENCOUNTER
Lyme test ordered but usually a rash develops if someone has lyme infection    The blood test may not turn positive till 3-5 weeks after tick bite    Orders Placed This Encounter   Procedures    Lyme Total AB W Reflex to IGM/IGG     thanks

## 2023-10-27 ENCOUNTER — TELEPHONE (OUTPATIENT)
Dept: INTERNAL MEDICINE CLINIC | Facility: CLINIC | Age: 33
End: 2023-10-27

## 2023-10-27 DIAGNOSIS — F33.1 MODERATE EPISODE OF RECURRENT MAJOR DEPRESSIVE DISORDER (HCC): ICD-10-CM

## 2023-10-27 DIAGNOSIS — F41.1 GAD (GENERALIZED ANXIETY DISORDER): Primary | ICD-10-CM

## 2023-10-27 NOTE — TELEPHONE ENCOUNTER
Hi, this is my name's Felicia Schaeffer. My son Henok Echeverria saw Doctor Nehemiah Mathews in June and I think I had a follow up appointment as well. He said he's been trying to call to see if Doctor Nehemiah Mathews would refer him for a if it's needed for a lime disease blood test and he said he hasn't heard back. So if you could, I mean if you could call him, it would be great for, gaby, [phone]. Let me give you my number, [phone]. His birth date is [birth year] and his phone number is. Gosh, I don't know anybody's number. Let's see. His phone number is [phone]. I'd really appreciate a phone call back. Thank you.

## 2023-11-01 ENCOUNTER — TELEPHONE (OUTPATIENT)
Dept: PSYCHIATRY | Facility: CLINIC | Age: 33
End: 2023-11-01

## 2023-11-01 NOTE — TELEPHONE ENCOUNTER
Was calling pt in regards to routine referral and adding to proper wait list. LVM for pt to contact intake dept. Pt is already on wait list for med mgmt. Referral is for talk therapy.

## 2023-11-16 ENCOUNTER — APPOINTMENT (OUTPATIENT)
Dept: LAB | Age: 33
End: 2023-11-16
Payer: COMMERCIAL
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
## 2023-11-16 DIAGNOSIS — Z13.6 ENCOUNTER FOR LIPID SCREENING FOR CARDIOVASCULAR DISEASE: ICD-10-CM

## 2023-11-16 DIAGNOSIS — Z11.59 NEED FOR HEPATITIS C SCREENING TEST: ICD-10-CM

## 2023-11-16 DIAGNOSIS — F33.41 RECURRENT MAJOR DEPRESSIVE DISORDER, IN PARTIAL REMISSION (HCC): ICD-10-CM

## 2023-11-16 DIAGNOSIS — Z13.220 ENCOUNTER FOR LIPID SCREENING FOR CARDIOVASCULAR DISEASE: ICD-10-CM

## 2023-11-16 DIAGNOSIS — H93.12 LEFT-SIDED TINNITUS: ICD-10-CM

## 2023-11-16 LAB — B BURGDOR IGG+IGM SER QL IA: NEGATIVE

## 2023-11-16 PROCEDURE — 86618 LYME DISEASE ANTIBODY: CPT

## 2023-11-16 PROCEDURE — 36415 COLL VENOUS BLD VENIPUNCTURE: CPT

## 2023-11-20 ENCOUNTER — TELEPHONE (OUTPATIENT)
Dept: INTERNAL MEDICINE CLINIC | Facility: CLINIC | Age: 33
End: 2023-11-20

## 2023-11-20 NOTE — TELEPHONE ENCOUNTER
----- Message from Michelle De La Paz DO sent at 11/17/2023 11:38 AM EST -----  Lyme test results are back and are negative/no sign of lyme infection, thank you

## 2024-01-22 DIAGNOSIS — F41.1 GAD (GENERALIZED ANXIETY DISORDER): ICD-10-CM

## 2024-01-22 DIAGNOSIS — F33.41 RECURRENT MAJOR DEPRESSIVE DISORDER, IN PARTIAL REMISSION (HCC): ICD-10-CM

## 2024-01-22 RX ORDER — ESCITALOPRAM OXALATE 10 MG/1
10 TABLET ORAL DAILY
Qty: 90 TABLET | Refills: 0 | Status: SHIPPED | OUTPATIENT
Start: 2024-01-22 | End: 2024-01-26 | Stop reason: SDUPTHER

## 2024-01-22 NOTE — TELEPHONE ENCOUNTER
Patient called left message for refill  also requesting him to make an appointment since last visit was 09/2023

## 2024-01-26 ENCOUNTER — OFFICE VISIT (OUTPATIENT)
Dept: INTERNAL MEDICINE CLINIC | Facility: CLINIC | Age: 34
End: 2024-01-26
Payer: COMMERCIAL

## 2024-01-26 VITALS
BODY MASS INDEX: 18.53 KG/M2 | WEIGHT: 116 LBS | DIASTOLIC BLOOD PRESSURE: 76 MMHG | OXYGEN SATURATION: 98 % | HEART RATE: 71 BPM | TEMPERATURE: 98 F | SYSTOLIC BLOOD PRESSURE: 116 MMHG

## 2024-01-26 DIAGNOSIS — F41.1 GAD (GENERALIZED ANXIETY DISORDER): ICD-10-CM

## 2024-01-26 DIAGNOSIS — F33.42 RECURRENT MAJOR DEPRESSIVE DISORDER, IN FULL REMISSION (HCC): ICD-10-CM

## 2024-01-26 PROCEDURE — 99213 OFFICE O/P EST LOW 20 MIN: CPT | Performed by: INTERNAL MEDICINE

## 2024-01-26 RX ORDER — ESCITALOPRAM OXALATE 10 MG/1
10 TABLET ORAL DAILY
Qty: 90 TABLET | Refills: 1 | Status: SHIPPED | OUTPATIENT
Start: 2024-01-26

## 2024-01-26 NOTE — PROGRESS NOTES
Name: Miguel Angel Goldberg      : 1990      MRN: [id number]  Encounter Provider: Ubaldo Cummings DO  Encounter Date: 2024   Encounter department: [facility] INTERNAL MEDICINE    Assessment & Plan     1. Recurrent major depressive disorder, in full remission (HCC)  Comments:  doing better, c/w lexapro and f/u in 6 mos  Orders:  -     escitalopram (LEXAPRO) 10 mg tablet; Take 1 tablet (10 mg total) by mouth daily    2. WHITNEY (generalized anxiety disorder)  Comments:  doing better, c/w lexapro and f/u in 6 mos  Orders:  -     escitalopram (LEXAPRO) 10 mg tablet; Take 1 tablet (10 mg total) by mouth daily        Depression Screening and Follow-up Plan: Patient was screened for depression during today's encounter. They screened negative with a PHQ-9 score of 0.      Subjective      HPI    Here for follow up, miguel angel is overall doing well.  He is taking lexapro every day and is feeling much better now than last year.  PHQ-9 score of 0 and WHITNEY-7 score of 0.  He lost some weight but is looking to try and gain this back.  He is seeing a therapist which has been helping as well.  He is working and trying to stay active.  He has no other questions or concerns today and ROS is otherwise negative.    Review of Systems   Constitutional:  Negative for fever.   Respiratory:  Negative for shortness of breath.    Cardiovascular:  Negative for chest pain.   Gastrointestinal:  Negative for abdominal pain.   Musculoskeletal:  Negative for gait problem.   Allergic/Immunologic: Negative for immunocompromised state.   Neurological:  Negative for dizziness.   Psychiatric/Behavioral:  Negative for dysphoric mood. The patient is not nervous/anxious.        Current Outpatient Medications on File Prior to Visit   Medication Sig    cyanocobalamin (VITAMIN B-12) 1000 MCG tablet Take 1,000 mcg by mouth daily    loratadine (CLARITIN) 10 mg tablet Take 10 mg by mouth daily    multivitamin-iron-minerals-folic acid (CENTRUM) chewable  tablet Chew 1 tablet daily    Triamcinolone Acetonide (Nasacort Allergy) 55 MCG/ACT nasal spray by Each Nare route daily    [DISCONTINUED] escitalopram (LEXAPRO) 10 mg tablet Take 1 tablet (10 mg total) by mouth daily       Objective     /76 (BP Location: Left arm, Patient Position: Sitting, Cuff Size: Standard)   Pulse 71   Temp 98 °F (36.7 °C)   Wt 52.6 kg (116 lb)   SpO2 98%   BMI 18.53 kg/m²     Physical Exam  Vitals reviewed.   Constitutional:       General: He is not in acute distress.  HENT:      Head: Normocephalic and atraumatic.      Right Ear: Tympanic membrane normal.      Left Ear: Tympanic membrane normal.      Mouth/Throat:      Pharynx: Oropharynx is clear.   Eyes:      Conjunctiva/sclera: Conjunctivae normal.   Cardiovascular:      Rate and Rhythm: Normal rate and regular rhythm.      Heart sounds: No murmur heard.  Pulmonary:      Effort: Pulmonary effort is normal.      Breath sounds: No wheezing or rales.   Abdominal:      General: Bowel sounds are normal.      Palpations: Abdomen is soft.      Tenderness: There is no abdominal tenderness.   Neurological:      Mental Status: He is alert. Mental status is at baseline.   Psychiatric:         Mood and Affect: Mood normal.         Behavior: Behavior normal.       Ubaldo Cummings DO

## 2024-09-11 ENCOUNTER — TELEPHONE (OUTPATIENT)
Age: 34
End: 2024-09-11

## 2024-09-11 NOTE — TELEPHONE ENCOUNTER
Pt's mother called regarding the  referral placed for pt back in January; verbal consent given. She is looking to see if Dr. Cummings can provide him with a new one.     Pt was unaware that Dr. Cummings is leaving the network, he is scheduled with Dr. Banerjee 9/12.    Please advise once referral placed or if they will need to wait until their appt with Dr. Banerjee tomorrow.

## 2024-09-12 ENCOUNTER — OFFICE VISIT (OUTPATIENT)
Dept: FAMILY MEDICINE CLINIC | Facility: CLINIC | Age: 34
End: 2024-09-12
Payer: COMMERCIAL

## 2024-09-12 VITALS
TEMPERATURE: 98 F | WEIGHT: 120 LBS | DIASTOLIC BLOOD PRESSURE: 66 MMHG | BODY MASS INDEX: 19.29 KG/M2 | OXYGEN SATURATION: 98 % | SYSTOLIC BLOOD PRESSURE: 112 MMHG | HEIGHT: 66 IN | HEART RATE: 81 BPM

## 2024-09-12 DIAGNOSIS — F33.1 MODERATE EPISODE OF RECURRENT MAJOR DEPRESSIVE DISORDER (HCC): ICD-10-CM

## 2024-09-12 DIAGNOSIS — Z00.00 ANNUAL PHYSICAL EXAM: Primary | ICD-10-CM

## 2024-09-12 DIAGNOSIS — F41.1 GAD (GENERALIZED ANXIETY DISORDER): ICD-10-CM

## 2024-09-12 PROCEDURE — 99213 OFFICE O/P EST LOW 20 MIN: CPT | Performed by: FAMILY MEDICINE

## 2024-09-12 PROCEDURE — 99395 PREV VISIT EST AGE 18-39: CPT | Performed by: FAMILY MEDICINE

## 2024-09-12 RX ORDER — ESCITALOPRAM OXALATE 10 MG/1
10 TABLET ORAL DAILY
Qty: 90 TABLET | Refills: 1 | Status: SHIPPED | OUTPATIENT
Start: 2024-09-12

## 2024-09-12 NOTE — PROGRESS NOTES
"Ambulatory Visit  Name: Aldo Goldberg      : 1990      MRN: [id number]  Encounter Provider: Dudley Banerjee DO  Encounter Date: 2024   Encounter department: [facility]    Assessment & Plan  Annual physical exam    Moderate episode of recurrent major depressive disorder (HCC)  Depression Screening Follow-up Plan: Patient's depression screening was positive with a PHQ-9 score of 10. Patient assessed for underlying major depression. They have no active suicidal ideations. Brief counseling provided and recommend additional follow-up/re-evaluation next office visit.  - PHQ-9: 10 (moderate depression)  - Patient with history of recurring MDD  - Has been on multiple antidepressant medications in the past; Lexapro, Paxil, Zoloft, Effexor  - Meets with a therapist bi weekly   - Reports meds in the past helping a little and depressive symptoms either being treated or going away on their own then stopping medications  - Of the medications tried in the past the patient notes most relief with Lexapro   - Denies any SI/HI      Plan  - Start Lexapro 10 mg QD  - F/u in 1 month  Orders:    escitalopram (LEXAPRO) 10 mg tablet; Take 1 tablet (10 mg total) by mouth daily    WHITNEY (generalized anxiety disorder)    Orders:    escitalopram (LEXAPRO) 10 mg tablet; Take 1 tablet (10 mg total) by mouth daily         History of Present Illness   34 yoM PMHx of MDD, WHITNEY presents for annual physical. Patient notes ongoing issues with depression mainly. He notes history of waxing/waning symptoms of depression and anxiety. Notes constant feelings of \"doom and gloom\" and depression symptoms coming on in waves. Reports being on multiple drugs in the past with Lexapro, Paxil, Zoloft, Effexor in the past that he does not wish to go through again. Meets with a therapist every other week. Notes difficulty sleeping with racing thoughts at night and note being able to relax.       Review of Systems "   Constitutional:  Negative for chills and fever.   HENT:  Negative for ear pain and sore throat.    Eyes:  Negative for pain and visual disturbance.   Respiratory:  Negative for cough and shortness of breath.    Cardiovascular:  Negative for chest pain and palpitations.   Gastrointestinal:  Negative for abdominal pain and vomiting.   Endocrine: Negative for polydipsia and polyuria.   Genitourinary:  Negative for dysuria and hematuria.   Musculoskeletal:  Negative for arthralgias and back pain.   Skin:  Negative for color change and rash.   Neurological:  Negative for dizziness, light-headedness and headaches.   Psychiatric/Behavioral:  Positive for dysphoric mood and sleep disturbance. Negative for hallucinations, self-injury and suicidal ideas. The patient is nervous/anxious.    All other systems reviewed and are negative.    History reviewed. No pertinent past medical history.  History reviewed. No pertinent surgical history.  Family History   Problem Relation Age of Onset    Depression Mother     Breast cancer Mother     Depression Father     Depression Brother     Heart disease Maternal Grandfather     Colon cancer Paternal Grandfather     Breast cancer Maternal Aunt         mom's cousin    Stroke Neg Hx     Thyroid disease Neg Hx      Social History     Tobacco Use    Smoking status: Former    Smokeless tobacco: Never   Vaping Use    Vaping status: Never Used   Substance and Sexual Activity    Alcohol use: Yes     Comment: occ    Drug use: No    Sexual activity: Yes     Current Outpatient Medications on File Prior to Visit   Medication Sig    cyanocobalamin (VITAMIN B-12) 1000 MCG tablet Take 1,000 mcg by mouth daily    loratadine (CLARITIN) 10 mg tablet Take 10 mg by mouth daily    multivitamin-iron-minerals-folic acid (CENTRUM) chewable tablet Chew 1 tablet daily    Triamcinolone Acetonide (Nasacort Allergy) 55 MCG/ACT nasal spray by Each Nare route daily     No Known Allergies  Immunization History  "  Administered Date(s) Administered    COVID-19 MODERNA VACC 0.5 ML IM 01/22/2021, 02/27/2021     Objective     /66 (BP Location: Left arm, Patient Position: Sitting, Cuff Size: Standard)   Pulse 81   Temp 98 °F (36.7 °C)   Ht 5' 6\" (1.676 m)   Wt 54.4 kg (120 lb)   SpO2 98%   BMI 19.37 kg/m²     Physical Exam  Vitals and nursing note reviewed.   Constitutional:       General: He is not in acute distress.     Appearance: Normal appearance. He is well-developed. He is not ill-appearing.   HENT:      Head: Normocephalic and atraumatic.      Right Ear: External ear normal.      Left Ear: External ear normal.      Nose: Nose normal.   Eyes:      Extraocular Movements: Extraocular movements intact.      Conjunctiva/sclera: Conjunctivae normal.   Cardiovascular:      Rate and Rhythm: Normal rate and regular rhythm.      Pulses: Normal pulses.      Heart sounds: Normal heart sounds. No murmur heard.  Pulmonary:      Effort: Pulmonary effort is normal. No respiratory distress.      Breath sounds: Normal breath sounds.   Abdominal:      Palpations: Abdomen is soft.      Tenderness: There is no abdominal tenderness.   Musculoskeletal:         General: No swelling.      Cervical back: Neck supple.      Right lower leg: No edema.      Left lower leg: No edema.   Skin:     General: Skin is warm and dry.      Capillary Refill: Capillary refill takes less than 2 seconds.   Neurological:      Mental Status: He is alert and oriented to person, place, and time.       Administrative Statements   I have spent a total time of 45 minutes in caring for this patient on the day of the visit/encounter including Risks and benefits of tx options, Instructions for management, Patient and family education, Importance of tx compliance, Documenting in the medical record, and Obtaining or reviewing history  .  "

## 2024-09-15 PROBLEM — F33.1 MODERATE EPISODE OF RECURRENT MAJOR DEPRESSIVE DISORDER (HCC): Status: ACTIVE | Noted: 2024-09-15

## 2024-09-15 PROBLEM — F33.41 RECURRENT MAJOR DEPRESSIVE DISORDER, IN PARTIAL REMISSION (HCC): Status: RESOLVED | Noted: 2018-02-28 | Resolved: 2024-09-15

## 2024-09-16 NOTE — ASSESSMENT & PLAN NOTE
Depression Screening Follow-up Plan: Patient's depression screening was positive with a PHQ-9 score of 10. Patient assessed for underlying major depression. They have no active suicidal ideations. Brief counseling provided and recommend additional follow-up/re-evaluation next office visit.  - PHQ-9: 10 (moderate depression)  - Patient with history of recurring MDD  - Has been on multiple antidepressant medications in the past; Lexapro, Paxil, Zoloft, Effexor  - Meets with a therapist bi weekly   - Reports meds in the past helping a little and depressive symptoms either being treated or going away on their own then stopping medications  - Of the medications tried in the past the patient notes most relief with Lexapro   - Denies any SI/HI      Plan  - Start Lexapro 10 mg QD  - F/u in 1 month  Orders:    escitalopram (LEXAPRO) 10 mg tablet; Take 1 tablet (10 mg total) by mouth daily

## 2024-09-26 ENCOUNTER — TELEMEDICINE (OUTPATIENT)
Dept: FAMILY MEDICINE CLINIC | Facility: CLINIC | Age: 34
End: 2024-09-26
Payer: COMMERCIAL

## 2024-09-26 VITALS — WEIGHT: 120 LBS | BODY MASS INDEX: 19.29 KG/M2 | HEIGHT: 66 IN

## 2024-09-26 DIAGNOSIS — F41.1 GAD (GENERALIZED ANXIETY DISORDER): ICD-10-CM

## 2024-09-26 DIAGNOSIS — F33.1 MODERATE EPISODE OF RECURRENT MAJOR DEPRESSIVE DISORDER (HCC): Primary | ICD-10-CM

## 2024-09-26 PROCEDURE — 99213 OFFICE O/P EST LOW 20 MIN: CPT | Performed by: FAMILY MEDICINE

## 2024-09-26 RX ORDER — AMOXICILLIN 500 MG/1
CAPSULE ORAL
COMMUNITY
Start: 2024-09-09

## 2024-09-26 NOTE — PROGRESS NOTES
Virtual Regular Visit  Name: Aldo Goldberg      : 1990      MRN: [id number]  Encounter Provider: Dudley Banerjee DO  Encounter Date: 2024   Encounter department: [facility]    Verification of patient location:    Patient is located at Home in the following state in which I hold an active license [place]    Assessment & Plan  Moderate episode of recurrent major depressive disorder (HCC)  - F/u visit after starting patient on Lexapro  - PHQ-9: 10 (moderate)  - Patient reports no significant change in mood after starting medication  - Reports not being consistent with med    Plan  - Counseled patient at length on increased benefit of pharmacotherapy with ongoing therapy  - Discussed at length alternative treatment options such as supplements, exercise, health eating given patients apprehension with taking medication  - Continue medication   - F/u in 1 month          WHITNEY (generalized anxiety disorder)              Encounter provider Dudley Banerjee DO    The patient was identified by name and date of birth. Aldo Goldberg was informed that this is a telemedicine visit and that the visit is being conducted through the [employer] Teams platform. He agrees to proceed..  My office door was closed. No one else was in the room.  He acknowledged consent and understanding of privacy and security of the video platform. The patient has agreed to participate and understands they can discontinue the visit at any time.    Patient is aware this is a billable service.     History of Present Illness     34 yoM presenting for 2 week follow up for ongoing treatment of MDD. Patient having been started on Lexapro at last office visit. On follow up patient is reporting not being consistent with med and general reluctance to take medication. He is without any SI/HI. He reports a lot of situational changes occurring in his life that he feels like are contributing to his mood.            History obtained from : patient  Review of Systems   Constitutional:  Negative for chills and fever.   HENT:  Negative for ear pain and sore throat.    Eyes:  Negative for pain and visual disturbance.   Respiratory:  Negative for cough and shortness of breath.    Cardiovascular:  Negative for chest pain and palpitations.   Gastrointestinal:  Negative for abdominal pain and vomiting.   Genitourinary:  Negative for dysuria and hematuria.   Musculoskeletal:  Negative for arthralgias and back pain.   Skin:  Negative for color change and rash.   Neurological:  Negative for dizziness, light-headedness and headaches.   Psychiatric/Behavioral:  Positive for dysphoric mood.    All other systems reviewed and are negative.    Pertinent Medical History     Current Outpatient Medications on File Prior to Visit   Medication Sig Dispense Refill    amoxicillin (AMOXIL) 500 mg capsule       cyanocobalamin (VITAMIN B-12) 1000 MCG tablet Take 1,000 mcg by mouth daily      escitalopram (LEXAPRO) 10 mg tablet Take 1 tablet (10 mg total) by mouth daily 90 tablet 1    loratadine (CLARITIN) 10 mg tablet Take 10 mg by mouth daily      multivitamin-iron-minerals-folic acid (CENTRUM) chewable tablet Chew 1 tablet daily      Triamcinolone Acetonide (Nasacort Allergy) 55 MCG/ACT nasal spray by Each Nare route daily       No current facility-administered medications on file prior to visit.            Visit Time  Total Visit Duration: 60 minutes

## 2024-10-07 NOTE — ASSESSMENT & PLAN NOTE
- F/u visit after starting patient on Lexapro  - PHQ-9: 10 (moderate)  - Patient reports no significant change in mood after starting medication  - Reports not being consistent with med    Plan  - Counseled patient at length on increased benefit of pharmacotherapy with ongoing therapy  - Discussed at length alternative treatment options such as supplements, exercise, health eating given patients apprehension with taking medication  - Continue medication   - F/u in 1 month